# Patient Record
Sex: FEMALE | Race: OTHER | HISPANIC OR LATINO | URBAN - METROPOLITAN AREA
[De-identification: names, ages, dates, MRNs, and addresses within clinical notes are randomized per-mention and may not be internally consistent; named-entity substitution may affect disease eponyms.]

---

## 2020-10-21 ENCOUNTER — APPOINTMENT (RX ONLY)
Dept: URBAN - METROPOLITAN AREA CLINIC 116 | Facility: CLINIC | Age: 40
Setting detail: DERMATOLOGY
End: 2020-10-21

## 2020-10-21 VITALS — TEMPERATURE: 98 F

## 2020-10-21 DIAGNOSIS — L80 VITILIGO: ICD-10-CM

## 2020-10-21 PROCEDURE — ? COUNSELING

## 2020-10-21 PROCEDURE — ? FULL BODY SKIN EXAM - DECLINED

## 2020-10-21 PROCEDURE — ? COUNSELING: TOPICAL STEROIDS

## 2020-10-21 PROCEDURE — 99202 OFFICE O/P NEW SF 15 MIN: CPT

## 2020-10-21 PROCEDURE — ? PRESCRIPTION

## 2020-10-21 PROCEDURE — ? ADDITIONAL NOTES

## 2020-10-21 RX ORDER — CLOBETASOL PROPIONATE 0.5 MG/G
CREAM TOPICAL BID
Qty: 1 | Refills: 1 | Status: ERX | COMMUNITY
Start: 2020-10-21

## 2020-10-21 RX ORDER — HYDROCORTISONE 25 MG/G
CREAM TOPICAL BID
Qty: 1 | Refills: 1 | Status: ERX | COMMUNITY
Start: 2020-10-21

## 2020-10-21 RX ADMIN — CLOBETASOL PROPIONATE: 0.5 CREAM TOPICAL at 00:00

## 2020-10-21 RX ADMIN — HYDROCORTISONE: 25 CREAM TOPICAL at 00:00

## 2020-10-21 ASSESSMENT — LOCATION SIMPLE DESCRIPTION DERM
LOCATION SIMPLE: RIGHT LOWER EXTREMITY
LOCATION SIMPLE: RIGHT INNER THIGH
LOCATION SIMPLE: RIGHT AXILLARY VAULT
LOCATION SIMPLE: RIGHT HAND
LOCATION SIMPLE: LEFT AXILLARY VAULT
LOCATION SIMPLE: LABIA MAJORA
LOCATION SIMPLE: LEFT HAND
LOCATION SIMPLE: VULVA
LOCATION SIMPLE: LEFT INNER THIGH

## 2020-10-21 ASSESSMENT — LOCATION DETAILED DESCRIPTION DERM
LOCATION DETAILED: RIGHT AXILLARY VAULT
LOCATION DETAILED: RIGHT LABIA MAJORA
LOCATION DETAILED: RIGHT MEDIAL POSTERIOR THIGH
LOCATION DETAILED: RIGHT LABIUM MAJUS
LOCATION DETAILED: RIGHT MEDIAL THIGH
LOCATION DETAILED: LEFT MEDIAL POSTERIOR THIGH
LOCATION DETAILED: LEFT AXILLARY VAULT
LOCATION DETAILED: LEFT ULNAR DORSAL HAND
LOCATION DETAILED: RIGHT RADIAL DORSAL HAND

## 2020-10-21 ASSESSMENT — LOCATION ZONE DERM
LOCATION ZONE: AXILLAE
LOCATION ZONE: LEG
LOCATION ZONE: VULVA
LOCATION ZONE: HAND

## 2020-10-21 NOTE — PROCEDURE: MIPS QUALITY
Quality 431: Preventive Care And Screening: Unhealthy Alcohol Use - Screening: Patient screened for unhealthy alcohol use using a single question and scores 2 or greater episodes per year and brief intervention occurred
Quality 130: Documentation Of Current Medications In The Medical Record: Current Medications Documented
Quality 226: Preventive Care And Screening: Tobacco Use: Screening And Cessation Intervention: Patient screened for tobacco use, is a smoker AND received Cessation Counseling
Detail Level: Detailed

## 2020-10-21 NOTE — PROCEDURE: ADDITIONAL NOTES
Detail Level: Simple
Additional Notes: Patient consent was obtained to proceed with the visit and recommended plan of care after discussion of all risks and benefits, including the risks of COVID-19 exposure.
Additional Notes: Patient reports a recent thyroid test was wnl